# Patient Record
Sex: FEMALE | URBAN - METROPOLITAN AREA
[De-identification: names, ages, dates, MRNs, and addresses within clinical notes are randomized per-mention and may not be internally consistent; named-entity substitution may affect disease eponyms.]

---

## 2023-06-08 ENCOUNTER — HOSPITAL ENCOUNTER (EMERGENCY)
Age: 28
Discharge: HOME | End: 2023-06-08

## 2023-12-27 ENCOUNTER — HOSPITAL ENCOUNTER (EMERGENCY)
Age: 28
Discharge: HOME | End: 2023-12-27
Attending: EMERGENCY MEDICINE
Payer: MEDICAID

## 2023-12-27 ENCOUNTER — APPOINTMENT (OUTPATIENT)
Dept: GENERAL RADIOLOGY | Age: 28
End: 2023-12-27
Payer: MEDICAID

## 2023-12-27 VITALS
DIASTOLIC BLOOD PRESSURE: 73 MMHG | TEMPERATURE: 98.4 F | OXYGEN SATURATION: 100 % | RESPIRATION RATE: 20 BRPM | HEART RATE: 85 BPM | SYSTOLIC BLOOD PRESSURE: 122 MMHG

## 2023-12-27 DIAGNOSIS — R07.89 CHEST DISCOMFORT: Primary | ICD-10-CM

## 2023-12-27 LAB
ANION GAP SERPL CALCULATED.3IONS-SCNC: 10 MMOL/L (ref 3–16)
BASOPHILS # BLD: 0 K/UL (ref 0–0.2)
BASOPHILS NFR BLD: 0.6 %
BUN SERPL-MCNC: 12 MG/DL (ref 7–20)
CALCIUM SERPL-MCNC: 6.3 MG/DL (ref 8.3–10.6)
CHLORIDE SERPL-SCNC: 104 MMOL/L (ref 99–110)
CO2 SERPL-SCNC: 24 MMOL/L (ref 21–32)
CREAT SERPL-MCNC: <0.5 MG/DL (ref 0.6–1.1)
DEPRECATED RDW RBC AUTO: 15.5 % (ref 12.4–15.4)
EKG ATRIAL RATE: 74 BPM
EKG DIAGNOSIS: NORMAL
EKG P AXIS: 40 DEGREES
EKG P-R INTERVAL: 160 MS
EKG Q-T INTERVAL: 380 MS
EKG QRS DURATION: 76 MS
EKG QTC CALCULATION (BAZETT): 421 MS
EKG R AXIS: 36 DEGREES
EKG T AXIS: 39 DEGREES
EKG VENTRICULAR RATE: 74 BPM
EOSINOPHIL # BLD: 0.4 K/UL (ref 0–0.6)
EOSINOPHIL NFR BLD: 6.6 %
GFR SERPLBLD CREATININE-BSD FMLA CKD-EPI: >60 ML/MIN/{1.73_M2}
GLUCOSE SERPL-MCNC: 92 MG/DL (ref 70–99)
HCG SERPL QL: NEGATIVE
HCT VFR BLD AUTO: 32.1 % (ref 36–48)
HGB BLD-MCNC: 10.1 G/DL (ref 12–16)
LYMPHOCYTES # BLD: 1.6 K/UL (ref 1–5.1)
LYMPHOCYTES NFR BLD: 30.3 %
MCH RBC QN AUTO: 22.3 PG (ref 26–34)
MCHC RBC AUTO-ENTMCNC: 31.5 G/DL (ref 31–36)
MCV RBC AUTO: 70.6 FL (ref 80–100)
MONOCYTES # BLD: 0.5 K/UL (ref 0–1.3)
MONOCYTES NFR BLD: 9.1 %
NEUTROPHILS # BLD: 2.9 K/UL (ref 1.7–7.7)
NEUTROPHILS NFR BLD: 53.4 %
NT-PROBNP SERPL-MCNC: 62 PG/ML (ref 0–124)
PLATELET # BLD AUTO: 211 K/UL (ref 135–450)
PMV BLD AUTO: 11.5 FL (ref 5–10.5)
POTASSIUM SERPL-SCNC: 4 MMOL/L (ref 3.5–5.1)
RBC # BLD AUTO: 4.54 M/UL (ref 4–5.2)
SODIUM SERPL-SCNC: 138 MMOL/L (ref 136–145)
TROPONIN, HIGH SENSITIVITY: <6 NG/L (ref 0–14)
WBC # BLD AUTO: 5.4 K/UL (ref 4–11)

## 2023-12-27 PROCEDURE — 84703 CHORIONIC GONADOTROPIN ASSAY: CPT

## 2023-12-27 PROCEDURE — 85025 COMPLETE CBC W/AUTO DIFF WBC: CPT

## 2023-12-27 PROCEDURE — 84484 ASSAY OF TROPONIN QUANT: CPT

## 2023-12-27 PROCEDURE — 71046 X-RAY EXAM CHEST 2 VIEWS: CPT

## 2023-12-27 PROCEDURE — 93005 ELECTROCARDIOGRAM TRACING: CPT | Performed by: EMERGENCY MEDICINE

## 2023-12-27 PROCEDURE — 80048 BASIC METABOLIC PNL TOTAL CA: CPT

## 2023-12-27 PROCEDURE — 83880 ASSAY OF NATRIURETIC PEPTIDE: CPT

## 2023-12-27 PROCEDURE — 99285 EMERGENCY DEPT VISIT HI MDM: CPT

## 2023-12-27 NOTE — ED PROVIDER NOTES
[facility] Emergency Department  MEDICAL SCREENING EXAM    Date of Service: 12/27/2023    Reason for Visit: No chief complaint on file. Chest tightness      Patient History, Brief Exam, and Initial Assessment     Abbreviated HPI: Varinder Lucas is a 25 y.o. female presenting with a CC of chest pain. Has had chest pain / tightness on and off since yesterday. Non exertional. Some intermittent left arm tingling. No hx of heart problems or lung problems. Does not take birth control. No hx of blood clots. No recent travel or leg swelling. No cough or shortness of breath. .    INITIAL VITALS: BP: 122/73,  , Pulse: 85, Respirations: 14, SpO2: 100 %    Plan     Patient was evaluated in the REU for a medical screening exam.  To further evaluate the presenting complaints, the following orders have been placed:  Orders Placed This Encounter   Procedures    XR CHEST (2 VW)    CBC with Auto Differential    BMP w/ Reflex to MG    Troponin    BNP    HCG Qualitative, Serum        See primary provider's note for full details and final disposition. Current Facility-Administered Medications:   No orders of the defined types were placed in this encounter. REU Dispo     Stable for lobby while awaiting ED bed        Relevant Medical History     No past medical history on file. No past surgical history on file.   No Known Allergies       Emir Santos MD  12/27/23 3041       Emir Santos MD  12/27/23 [id number]

## 2023-12-27 NOTE — DISCHARGE INSTRUCTIONS
Take ibuprofen and tylenol for discomfort. Your tests are normal today with no findings of heart attack or other significant problems.

## 2023-12-27 NOTE — ED NOTES
[facility] Emergency Department  MEDICAL SCREENING EXAM    Date of Service: 12/27/2023    Reason for Visit: No chief complaint on file. Chest tightness      Patient History, Brief Exam, and Initial Assessment     Abbreviated HPI: Sy Thibodeaux is a 25 y.o. female presenting with a CC of chest pain. Has had chest pain / tightness on and off since yesterday. Non exertional. Some intermittent left arm tingling. No hx of heart problems or lung problems. Does not take birth control. No hx of blood clots. No recent travel or leg swelling. No cough or shortness of breath. .    INITIAL VITALS: BP: 122/73,  , Pulse: 85, Respirations: 14, SpO2: 100 %    Plan     Patient was evaluated in the REU for a medical screening exam.  To further evaluate the presenting complaints, the following orders have been placed:  Orders Placed This Encounter   Procedures    XR CHEST (2 VW)    CBC with Auto Differential    BMP w/ Reflex to MG    Troponin    BNP    HCG Qualitative, Serum        See primary provider's note for full details and final disposition. Current Facility-Administered Medications:   No orders of the defined types were placed in this encounter. REU Dispo     Stable for lobby while awaiting ED bed        Relevant Medical History     No past medical history on file. No past surgical history on file.   No Known Allergies       Maria R Forbes MD  12/27/23 [id number]       Maria R Forbes MD  12/27/23 7024

## 2024-01-24 ENCOUNTER — OFFICE VISIT (OUTPATIENT)
Age: 29
End: 2024-01-24

## 2024-01-24 VITALS
DIASTOLIC BLOOD PRESSURE: 80 MMHG | HEIGHT: 67 IN | BODY MASS INDEX: 28.03 KG/M2 | TEMPERATURE: 98.3 F | OXYGEN SATURATION: 100 % | HEART RATE: 75 BPM | SYSTOLIC BLOOD PRESSURE: 125 MMHG | WEIGHT: 178.6 LBS

## 2024-01-24 DIAGNOSIS — Z20.2 EXPOSURE TO CHLAMYDIA: Primary | ICD-10-CM

## 2024-01-24 PROBLEM — O09.90 HIGH-RISK PREGNANCY: Status: ACTIVE | Noted: 2024-01-24

## 2024-01-24 PROBLEM — R87.619 ABNORMAL PAP SMEAR OF CERVIX: Status: ACTIVE | Noted: 2019-08-13

## 2024-01-24 PROBLEM — D64.9 ANEMIA: Status: ACTIVE | Noted: 2020-09-04

## 2024-01-24 PROBLEM — M54.50 ACUTE LOW BACK PAIN WITHOUT SCIATICA: Status: ACTIVE | Noted: 2018-10-04

## 2024-01-24 PROBLEM — O99.019 ANEMIA OF PREGNANCY: Status: ACTIVE | Noted: 2024-01-24
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
## 2024-01-24 RX ORDER — AZITHROMYCIN 500 MG/1
1000 TABLET, FILM COATED ORAL DAILY
Qty: 2 TABLET | Refills: 0 | Status: SHIPPED | OUTPATIENT
Start: 2024-01-24 | End: 2024-01-25

## 2024-01-25 LAB
C TRACH DNA UR QL NAA+PROBE: NEGATIVE
N GONORRHOEA DNA UR QL NAA+PROBE: NEGATIVE
SPECIMEN TYPE: NORMAL
TRICHOMONAS VAGINALIS SCREEN: NEGATIVE